# Patient Record
Sex: FEMALE | Race: BLACK OR AFRICAN AMERICAN | NOT HISPANIC OR LATINO | Employment: FULL TIME | URBAN - METROPOLITAN AREA
[De-identification: names, ages, dates, MRNs, and addresses within clinical notes are randomized per-mention and may not be internally consistent; named-entity substitution may affect disease eponyms.]

---

## 2025-01-03 PROCEDURE — 99283 EMERGENCY DEPT VISIT LOW MDM: CPT

## 2025-01-03 ASSESSMENT — PAIN SCALES - GENERAL: PAINLEVEL_OUTOF10: 8

## 2025-01-03 ASSESSMENT — PAIN DESCRIPTION - ORIENTATION: ORIENTATION: LEFT

## 2025-01-03 ASSESSMENT — PAIN DESCRIPTION - FREQUENCY: FREQUENCY: CONSTANT/CONTINUOUS

## 2025-01-03 ASSESSMENT — PAIN DESCRIPTION - PAIN TYPE: TYPE: ACUTE PAIN

## 2025-01-03 ASSESSMENT — PAIN DESCRIPTION - LOCATION: LOCATION: FINGER (COMMENT WHICH ONE)

## 2025-01-03 ASSESSMENT — PAIN DESCRIPTION - ONSET: ONSET: GRADUAL

## 2025-01-03 ASSESSMENT — PAIN DESCRIPTION - DIRECTION: RADIATING_TOWARDS: MIDDLE

## 2025-01-03 ASSESSMENT — PAIN - FUNCTIONAL ASSESSMENT: PAIN_FUNCTIONAL_ASSESSMENT: 0-10

## 2025-01-03 ASSESSMENT — PAIN DESCRIPTION - PROGRESSION: CLINICAL_PROGRESSION: GRADUALLY WORSENING

## 2025-01-04 ENCOUNTER — HOSPITAL ENCOUNTER (EMERGENCY)
Facility: HOSPITAL | Age: 30
Discharge: HOME | End: 2025-01-04
Payer: COMMERCIAL

## 2025-01-04 ENCOUNTER — APPOINTMENT (OUTPATIENT)
Dept: RADIOLOGY | Facility: HOSPITAL | Age: 30
End: 2025-01-04
Payer: COMMERCIAL

## 2025-01-04 VITALS
TEMPERATURE: 99 F | RESPIRATION RATE: 20 BRPM | HEART RATE: 84 BPM | SYSTOLIC BLOOD PRESSURE: 124 MMHG | OXYGEN SATURATION: 100 % | DIASTOLIC BLOOD PRESSURE: 71 MMHG | HEIGHT: 62 IN | WEIGHT: 200 LBS | BODY MASS INDEX: 36.8 KG/M2

## 2025-01-04 DIAGNOSIS — S61.215A LACERATION OF LEFT RING FINGER WITHOUT FOREIGN BODY WITHOUT DAMAGE TO NAIL, INITIAL ENCOUNTER: Primary | ICD-10-CM

## 2025-01-04 DIAGNOSIS — M79.642 PAIN OF LEFT HAND: ICD-10-CM

## 2025-01-04 PROBLEM — G43.909 MIGRAINE HEADACHE: Status: ACTIVE | Noted: 2025-01-04

## 2025-01-04 PROBLEM — R53.83 FATIGUE: Status: ACTIVE | Noted: 2022-01-06

## 2025-01-04 PROBLEM — R55 SYNCOPE: Status: ACTIVE | Noted: 2020-09-08

## 2025-01-04 PROCEDURE — 73130 X-RAY EXAM OF HAND: CPT | Mod: LEFT SIDE | Performed by: RADIOLOGY

## 2025-01-04 PROCEDURE — 2500000001 HC RX 250 WO HCPCS SELF ADMINISTERED DRUGS (ALT 637 FOR MEDICARE OP): Performed by: PHYSICIAN ASSISTANT

## 2025-01-04 PROCEDURE — 2500000005 HC RX 250 GENERAL PHARMACY W/O HCPCS: Performed by: PHYSICIAN ASSISTANT

## 2025-01-04 PROCEDURE — 73130 X-RAY EXAM OF HAND: CPT | Mod: LT

## 2025-01-04 RX ORDER — LAMOTRIGINE 25 MG/1
2 TABLET ORAL
COMMUNITY
Start: 2024-08-07

## 2025-01-04 RX ORDER — IBUPROFEN 600 MG/1
600 TABLET ORAL EVERY 6 HOURS PRN
Qty: 20 TABLET | Refills: 0 | Status: SHIPPED | OUTPATIENT
Start: 2025-01-04

## 2025-01-04 RX ORDER — IBUPROFEN 600 MG/1
600 TABLET ORAL ONCE
Status: COMPLETED | OUTPATIENT
Start: 2025-01-04 | End: 2025-01-04

## 2025-01-04 RX ORDER — BACITRACIN ZINC 500 UNIT/G
1 OINTMENT IN PACKET (EA) TOPICAL ONCE
Status: COMPLETED | OUTPATIENT
Start: 2025-01-04 | End: 2025-01-04

## 2025-01-04 RX ORDER — BACITRACIN ZINC 500 UNIT/G
OINTMENT (GRAM) TOPICAL 2 TIMES DAILY
Qty: 14 G | Refills: 0 | Status: SHIPPED | OUTPATIENT
Start: 2025-01-04

## 2025-01-04 RX ORDER — TRAZODONE HYDROCHLORIDE 50 MG/1
50 TABLET ORAL NIGHTLY
COMMUNITY
Start: 2024-08-07

## 2025-01-04 RX ADMIN — BACITRACIN 1 APPLICATION: 500 OINTMENT TOPICAL at 02:28

## 2025-01-04 RX ADMIN — IBUPROFEN 600 MG: 600 TABLET, FILM COATED ORAL at 02:28

## 2025-01-04 ASSESSMENT — PAIN SCALES - GENERAL: PAINLEVEL_OUTOF10: 0 - NO PAIN

## 2025-01-04 ASSESSMENT — COLUMBIA-SUICIDE SEVERITY RATING SCALE - C-SSRS
6. HAVE YOU EVER DONE ANYTHING, STARTED TO DO ANYTHING, OR PREPARED TO DO ANYTHING TO END YOUR LIFE?: NO
2. HAVE YOU ACTUALLY HAD ANY THOUGHTS OF KILLING YOURSELF?: NO
1. IN THE PAST MONTH, HAVE YOU WISHED YOU WERE DEAD OR WISHED YOU COULD GO TO SLEEP AND NOT WAKE UP?: NO

## 2025-01-04 ASSESSMENT — PAIN DESCRIPTION - LOCATION: LOCATION: FINGER (COMMENT WHICH ONE)

## 2025-01-04 ASSESSMENT — PAIN - FUNCTIONAL ASSESSMENT: PAIN_FUNCTIONAL_ASSESSMENT: 0-10

## 2025-01-04 ASSESSMENT — PAIN DESCRIPTION - ORIENTATION: ORIENTATION: LEFT

## 2025-01-04 ASSESSMENT — PAIN DESCRIPTION - PROGRESSION: CLINICAL_PROGRESSION: GRADUALLY IMPROVING

## 2025-01-04 NOTE — DISCHARGE INSTRUCTIONS
Please continue Tylenol and/or ibuprofen as needed for pain.  Please apply topical antibiotic ointment 1-2 times per day as needed.  Please follow-up with orthopedic injury clinic if pain persist greater than 5 days.  Return to ER for any new or worsening symptoms.     orthopedic injury clinic   [facility] [place]  [address]  [address]  [place] [address]  [phone]  Open for walk-ins Monday through Friday 8:30 AM through 4 PM  open 10 AM to 2 PM Christmas oscar and New Year's oscar  Closed on Kim day and New Year's day

## 2025-01-04 NOTE — Clinical Note
Efraín Johnson was seen and treated in our emergency department on 1/3/2025.  She may return to work on 01/06/2025.       If you have any questions or concerns, please don't hesitate to call.      Nahed Rushing PA-C

## 2025-01-04 NOTE — ED TRIAGE NOTES
Pt came in for having a finger LAC left middle finger. Pt is up to date with tetanus shot. Pt cut it on glass.

## 2025-01-04 NOTE — ED PROVIDER NOTES
Chief Complaint   Patient presents with    Finger Laceration     HPI:   Efraín Johnson is an 29 y.o. female with history of migraine disorder who presents to the ED with her mother for evaluation of left hand pain.  Patient reports that on Thursday, 1/2/2025 she accidentally got her left hand smashed between a counter and door.  Cut her hand on the glass.  Thought it would get better but pain has persisted and she is now having swelling around her knuckles.  She is unable to make a fist without pain.  Rates pain 6/10.  Has not taken any medication for her symptoms.  Denies any numbness, tingling, extremity weakness.  Unsure of last tetanus.  LMP 12/14/2024.  Denies chance pregnancy.  Right-hand-dominant    Medications: Lamotrigine, trazodone  Soc HX:  No Known Allergies: NKDA  No past medical history on file.  No past surgical history on file.  No family history on file.     Physical Exam  Vitals and nursing note reviewed.   Constitutional:       General: She is not in acute distress.     Appearance: Normal appearance. She is not ill-appearing or toxic-appearing.      Comments: Elevated BMI   HENT:      Head: Atraumatic.   Eyes:      Pupils: Pupils are equal, round, and reactive to light.   Cardiovascular:      Rate and Rhythm: Normal rate and regular rhythm.      Pulses: Normal pulses.      Heart sounds: Normal heart sounds.   Pulmonary:      Effort: Pulmonary effort is normal. No respiratory distress.      Breath sounds: Normal breath sounds.   Musculoskeletal:        Hands:       Comments: Left hand: Roughly 4 cm superficial abrasion along the ulnar aspect of fourth digit from MCP to DIP.  Tenderness with palpation around the fourth and fifth MCP with subtle ecchymosis and edema.  Can actively perform isolated ROM of fourth DIP but cannot actively bend and PIP.  Reports pain with passive ROM of these joints.  Normal cap refill.  2-point sensation intact.  Can do okay sign, paper and scissors.  Unable to  completely close fist.  2+ radial pulse   Lymphadenopathy:      Cervical: No cervical adenopathy.   Skin:     General: Skin is warm and dry.      Capillary Refill: Capillary refill takes less than 2 seconds.   Neurological:      Mental Status: She is alert.      Cranial Nerves: No cranial nerve deficit.      Sensory: No sensory deficit.     VS: As documented in the triage note and EMR flowsheet from this visit were reviewed.    External Records Reviewed: I reviewed recent and relevant outside records including: Chart shows last tetanus 2015      Medical Decision Making:   ED Course as of 01/04/25 0352   Sat Jan 04, 2025   0156 Vitals Reviewed: Afebrile. Normotensive. Not tachycardic nor tachypneic. No hypoxia.   [KA]   0207 Is a 29-year-old female who presents to the ED for evaluation of left fourth digit and hand pain.  On exam she has roughly 4 cm superficial abrasion of the fourth digit between MCP and DIP.  She has tenderness with palpation over the fourth and fifth MCPs and is unable to make a complete fist secondary to pain.  Does not want her tetanus updated in the ED.  She has normal 2-point sensation and normal cap refill.  Will obtain x-ray of the hand.  Patient given ibuprofen and bacitracin.  No indication for other imaging. [KA]   0307 I personally viewed x-ray imaging see no evidence of fracture nor dislocation. [KA]   0344 Radiology concurs.  Discussed results with patient.  Recommended continued supportive care including Tylenol and/or ibuprofen, ice.  Vies to follow-up with orthopedic injury clinic.  Encouraged to return to ED for any new or worsening symptoms.  Patient agreeable plan. [KA]      ED Course User Index  [KA] Nahed Rushing PA-C         Diagnoses as of 01/04/25 0352   Laceration of left ring finger without foreign body without damage to nail, initial encounter   Pain of left hand      Escalation of Care: Appropriate for outpatient management     Counseling: Spoke with the patient and  discussed today´s findings, in addition to providing specific details for the plan of care and expected course.  Patient was given the opportunity to ask questions.    Discussed return precautions and importance of follow-up.  Advised to follow-up with Ortho.  Advised to return to the ED for changing or worsening symptoms, new symptoms, complaint specific precautions, and precautions listed on the discharge paperwork.  Educated on the common potential side effects of medications prescribed.    I advised the patient that the emergency evaluation and treatment provided today doesn't end their need for medical care. It is very important that they follow-up with their primary care provider or other specialist as instructed.    The plan of care was mutually agreed upon with the patient. The patient and/or family were given the opportunity to ask questions. All questions asked today in the ED were answered to the best of my ability with today's information.    I specifically advised the patient to return to the ED for changing or worsening symptoms, worrisome new symptoms, or for any complaint specific precautions listed on the discharge paperwork.    This patient was cared for in the setting of nationwide stress on resources and staffing.    This report was transcribed using voice recognition software.  Every effort was made to ensure accuracy, however, inadvertently computerized transcription errors may be present.       Nahed Rushing PA-C  01/04/25 0353